# Patient Record
Sex: FEMALE | Race: WHITE | NOT HISPANIC OR LATINO | URBAN - METROPOLITAN AREA
[De-identification: names, ages, dates, MRNs, and addresses within clinical notes are randomized per-mention and may not be internally consistent; named-entity substitution may affect disease eponyms.]

---

## 2017-08-12 ENCOUNTER — TRANSFERRED RECORDS (OUTPATIENT)
Dept: HEALTH INFORMATION MANAGEMENT | Facility: CLINIC | Age: 22
End: 2017-08-12

## 2017-08-23 DIAGNOSIS — H18.619 KERATOCONUS, STABLE CONDITION, UNSPECIFIED LATERALITY: Primary | ICD-10-CM

## 2017-08-24 ENCOUNTER — OFFICE VISIT (OUTPATIENT)
Dept: OPHTHALMOLOGY | Facility: CLINIC | Age: 22
End: 2017-08-24
Attending: OPHTHALMOLOGY
Payer: COMMERCIAL

## 2017-08-24 DIAGNOSIS — H18.619 KERATOCONUS, STABLE CONDITION, UNSPECIFIED LATERALITY: ICD-10-CM

## 2017-08-24 PROCEDURE — 99213 OFFICE O/P EST LOW 20 MIN: CPT | Mod: ZF

## 2017-08-24 ASSESSMENT — VISUAL ACUITY
METHOD: LEA SYMBOLS
OS_SC: ??
OD_SC: ??

## 2017-08-24 ASSESSMENT — CONF VISUAL FIELD: COMMENTS: UANBLE

## 2017-08-24 NOTE — MR AVS SNAPSHOT
After Visit Summary   2017    Miriam Mehta    MRN: [id number]           Patient Information     Date Of Birth          1995        Visit Information        Provider Department      2017 1:30 PM [facility]        Today's Diagnoses     Keratoconus, stable condition, unspecified laterality           Follow-ups after your visit        Who to contact     Please call your clinic at [phone] to:    Ask questions about your health    Make or cancel appointments    Discuss your medicines    Learn about your test results    Speak to your doctor   If you have compliments or concerns about an experience at your clinic, or if you wish to file a complaint, please contact [facility] Patient Relations at [phone] or email us at [email or web address]         Additional Information About Your Visit        MyChart Information     shopp is an electronic gateway that provides easy, online access to your medical records. With shopp, you can request a clinic appointment, read your test results, renew a prescription or communicate with your care team.     To sign up for [employer] visit the website at [email or web address]   You will be asked to enter the access code listed below, as well as some personal information. Please follow the directions to create your username and password.     Your access code is: [email or web address]  Expires: 2017  6:30 AM     Your access code will  in 90 days. If you need help or a new code, please contact your [facility] or call [phone] for assistance.        Care EveryWhere ID     This is your Care EveryWhere ID. This could be used by other organizations to access your [facility] medical records  [id number]         Blood Pressure from Last 3 Encounters:   No data found for BP    Weight from Last 3 Encounters:   No data found for Wt              Today, you had the  following     No orders found for display       Primary Care Provider    Doctor Unknown, MD       No address on file        Equal Access to Services     [facility]: Hadii aad ku hadwilmerchela Dailey, yasmani corona, krisjerrell popreynamorales frey, waxay idiin haysamaranalini chatmanbailey juveolivierpaul chavarria . So [facility] [phone].    ATENCIÓN: Si habla español, tiene a rodriguez disposición servicios gratuitos de asistencia lingüística. Llame al [phone].    We comply with applicable federal civil rights laws and [place] laws. We do not discriminate on the basis of race, color, national origin, age, disability sex, sexual orientation or gender identity.            Thank you!     Thank you for choosing E[facility] CLIN[facility]  for your care. Our goal is always to provide you with excellent care. Hearing back from our patients is one way we can continue to improve our services. Please take a few minutes to complete the written survey that you may receive in the mail after your visit with us. Thank you!             Your Updated Medication List - Protect others around you: Learn how to safely use, store and throw away your medicines at www.disposemymeds.org.      Notice  As of 8/24/2017 11:59 PM    You have not been prescribed any medications.

## 2017-08-24 NOTE — LETTER
8/24/2017       RE: Miriam Mehta  In care of Nelly Doan  [address]  [facility] [address]     Dear Dr. Hinton,    Thank you for referring your patient, Miriam Mehta, to the EYE CLINIC at [facility]. Please see a copy of my visit note below.    ~~~~~~~~~~~~~~~~~~~~~~~~~~~~~~~~~~~~~~~~~~~~~~~~~~~~~~~~~    21 yr F referred by Dr. Hinton for possible hydrops OD  Hx of Downs syndrome  Hx of Keratoconus OU    HPI: She is here for keratoconus evaluation. She had to drive for 4 hours and is unable to cooperate for the exam today. Per aide, right eye was red and painful 1.5 weeks prior, which has improved. They have noticed haziness of right cornea. There is no change in her activities of daily living. She is comfortable and can get around well.    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      Sincerely,      Chepe Wise MD, MA  Director, Cornea & Anterior Segment  Director, Fellowship in Cornea & External Disease  [facility] Department of Ophthalmology & Visual Neuroscience  : Farzana Petersen [phone]  Email: [email or web address]  Mobile: [phone]

## 2017-08-24 NOTE — PROGRESS NOTES
21 yr F referred by Dr. Hinton for possible hydrops OD  Hx of Downs syndrome  Hx of Keratoconus OU    HPI: She is here for keratoconus evaluation. She had to drive for 4 hours and is unable to cooperate for the exam today. Per aid, right eye was red and painful 1.5 weeks prior, which has improved. They have noticed haziness of right cornea. There is no change in her activities of daily living. She is comfortable and can get around well.    Meds: maxitrol BID OU    A/P:  Keratoconus OD>>OS  Severe hydrops OD  - Difficult exam today  - start Allyn QID OD  - continue tobradex BID OU    - poor candidate for penetrating keratoplasty (PK)   - patient's caregiver has not noted a large change in visual behaviors since the hydrops so would manage conservatively for now    - if visual rehab would be considered, rec GAEL Armendariz  Cornea fellow    ~~~~~~~~~~~~~~~~~~~~~~~~~~~~~~~~~~~~~~~~~~~~~~~~~~~~~~~~~~~~~~~~    Complete documentation of historical and exam elements from today's encounter can be found in the full encounter summary report (not reduplicated in this progress note). I personally obtained the chief complaint(s) and history of present illness.  I confirmed and edited as necessary the review of systems, past medical/surgical history, family history, social history, and examination findings as documented by others.  I examined the patient myself, and I personally reviewed the relevant tests, images, and reports as documented above. I formulated and edited as necessary the assessment and plan and discussed the findings and management plan with the patient and family.     Chepe Wise MD, MA  Director, Cornea & Anterior Segment  [facility] Department of Ophthalmology & Visual Neuroscience

## 2017-09-08 ASSESSMENT — SLIT LAMP EXAM - LIDS
COMMENTS: MILD LID EDEMA
COMMENTS: NORMAL